# Patient Record
Sex: MALE | Race: WHITE | NOT HISPANIC OR LATINO | Employment: FULL TIME | URBAN - METROPOLITAN AREA
[De-identification: names, ages, dates, MRNs, and addresses within clinical notes are randomized per-mention and may not be internally consistent; named-entity substitution may affect disease eponyms.]

---

## 2017-08-21 DIAGNOSIS — N40.1 ENLARGED PROSTATE WITH LOWER URINARY TRACT SYMPTOMS (LUTS): ICD-10-CM

## 2017-09-07 ENCOUNTER — ALLSCRIPTS OFFICE VISIT (OUTPATIENT)
Dept: OTHER | Facility: OTHER | Age: 57
End: 2017-09-07

## 2017-09-07 LAB
BILIRUB UR QL STRIP: NORMAL
CLARITY UR: NORMAL
COLOR UR: YELLOW
GLUCOSE (HISTORICAL): NORMAL
HGB UR QL STRIP.AUTO: NORMAL
KETONES UR STRIP-MCNC: NORMAL MG/DL
LEUKOCYTE ESTERASE UR QL STRIP: NORMAL
NITRITE UR QL STRIP: NORMAL
PH UR STRIP.AUTO: 5.5 [PH]
PROT UR STRIP-MCNC: NORMAL MG/DL
SP GR UR STRIP.AUTO: 1.02
UROBILINOGEN UR QL STRIP.AUTO: 0.2

## 2018-01-12 VITALS
SYSTOLIC BLOOD PRESSURE: 128 MMHG | WEIGHT: 192 LBS | HEIGHT: 72 IN | DIASTOLIC BLOOD PRESSURE: 84 MMHG | BODY MASS INDEX: 26.01 KG/M2

## 2018-11-19 ENCOUNTER — HOSPITAL ENCOUNTER (OUTPATIENT)
Dept: RADIOLOGY | Facility: HOSPITAL | Age: 58
Discharge: HOME | End: 2018-11-19
Payer: COMMERCIAL

## 2018-11-19 ENCOUNTER — TRANSCRIBE ORDERS (OUTPATIENT)
Dept: ADMINISTRATIVE | Facility: HOSPITAL | Age: 58
End: 2018-11-19

## 2018-11-19 DIAGNOSIS — M54.12 BRACHIAL NEURITIS: ICD-10-CM

## 2018-11-19 DIAGNOSIS — M54.12 BRACHIAL NEURITIS: Primary | ICD-10-CM

## 2018-11-19 PROCEDURE — 72050 X-RAY EXAM NECK SPINE 4/5VWS: CPT

## 2020-10-11 ENCOUNTER — NURSE TRIAGE (OUTPATIENT)
Dept: OTHER | Facility: OTHER | Age: 60
End: 2020-10-11

## 2020-10-11 DIAGNOSIS — Z20.828 EXPOSURE TO SARS-ASSOCIATED CORONAVIRUS: ICD-10-CM

## 2020-10-11 DIAGNOSIS — Z20.828 EXPOSURE TO SARS-ASSOCIATED CORONAVIRUS: Primary | ICD-10-CM

## 2020-10-11 PROCEDURE — U0003 INFECTIOUS AGENT DETECTION BY NUCLEIC ACID (DNA OR RNA); SEVERE ACUTE RESPIRATORY SYNDROME CORONAVIRUS 2 (SARS-COV-2) (CORONAVIRUS DISEASE [COVID-19]), AMPLIFIED PROBE TECHNIQUE, MAKING USE OF HIGH THROUGHPUT TECHNOLOGIES AS DESCRIBED BY CMS-2020-01-R: HCPCS | Performed by: FAMILY MEDICINE

## 2020-10-13 LAB — SARS-COV-2 RNA SPEC QL NAA+PROBE: NOT DETECTED

## 2020-10-28 ENCOUNTER — TELEPHONE (OUTPATIENT)
Dept: SURGICAL ONCOLOGY | Facility: CLINIC | Age: 60
End: 2020-10-28

## 2020-11-19 ENCOUNTER — CONSULT (OUTPATIENT)
Dept: HEMATOLOGY ONCOLOGY | Facility: CLINIC | Age: 60
End: 2020-11-19
Payer: COMMERCIAL

## 2020-11-19 VITALS
OXYGEN SATURATION: 95 % | WEIGHT: 197.3 LBS | DIASTOLIC BLOOD PRESSURE: 86 MMHG | HEART RATE: 75 BPM | RESPIRATION RATE: 18 BRPM | BODY MASS INDEX: 26.72 KG/M2 | SYSTOLIC BLOOD PRESSURE: 128 MMHG | HEIGHT: 72 IN | TEMPERATURE: 97.4 F

## 2020-11-19 DIAGNOSIS — R71.8 ELEVATED HEMATOCRIT: ICD-10-CM

## 2020-11-19 DIAGNOSIS — D72.828 OTHER ELEVATED WHITE BLOOD CELL (WBC) COUNT: Primary | ICD-10-CM

## 2020-11-19 DIAGNOSIS — D75.839 THROMBOCYTOSIS: ICD-10-CM

## 2020-11-19 PROCEDURE — 99244 OFF/OP CNSLTJ NEW/EST MOD 40: CPT | Performed by: NURSE PRACTITIONER

## 2020-11-19 RX ORDER — ESCITALOPRAM OXALATE 20 MG/1
20 TABLET ORAL DAILY
COMMUNITY

## 2020-11-19 RX ORDER — GAUZE BANDAGE 2" X 2"
BANDAGE TOPICAL
COMMUNITY

## 2020-11-19 RX ORDER — NEOMYCIN/POLYMYXIN B/PRAMOXINE 3.5-10K-1
CREAM (GRAM) TOPICAL
COMMUNITY

## 2020-11-19 RX ORDER — SITAGLIPTIN AND METFORMIN HYDROCHLORIDE 1000; 50 MG/1; MG/1
TABLET, FILM COATED, EXTENDED RELEASE ORAL
COMMUNITY

## 2020-11-19 RX ORDER — EMPAGLIFLOZIN 25 MG/1
25 TABLET, FILM COATED ORAL DAILY
COMMUNITY

## 2020-11-19 RX ORDER — GLIPIZIDE 5 MG/1
5 TABLET ORAL DAILY
COMMUNITY

## 2020-11-24 ENCOUNTER — TELEPHONE (OUTPATIENT)
Dept: HEMATOLOGY ONCOLOGY | Facility: MEDICAL CENTER | Age: 60
End: 2020-11-24

## 2020-11-25 ENCOUNTER — HOSPITAL ENCOUNTER (OUTPATIENT)
Dept: ULTRASOUND IMAGING | Facility: HOSPITAL | Age: 60
Discharge: HOME | End: 2020-11-25
Payer: COMMERCIAL

## 2020-11-25 DIAGNOSIS — D75.839 THROMBOCYTOSIS: ICD-10-CM

## 2020-11-25 DIAGNOSIS — D72.828 OTHER ELEVATED WHITE BLOOD CELL (WBC) COUNT: ICD-10-CM

## 2020-11-25 DIAGNOSIS — R71.8 ELEVATED HEMATOCRIT: ICD-10-CM

## 2020-11-25 PROCEDURE — 76700 US EXAM ABDOM COMPLETE: CPT

## 2020-12-02 ENCOUNTER — TELEPHONE (OUTPATIENT)
Dept: HEMATOLOGY ONCOLOGY | Facility: CLINIC | Age: 60
End: 2020-12-02

## 2020-12-04 ENCOUNTER — TELEPHONE (OUTPATIENT)
Dept: HEMATOLOGY ONCOLOGY | Facility: CLINIC | Age: 60
End: 2020-12-04

## 2020-12-11 ENCOUNTER — TELEPHONE (OUTPATIENT)
Dept: HEMATOLOGY ONCOLOGY | Facility: MEDICAL CENTER | Age: 60
End: 2020-12-11

## 2020-12-15 ENCOUNTER — TELEPHONE (OUTPATIENT)
Dept: HEMATOLOGY ONCOLOGY | Facility: CLINIC | Age: 60
End: 2020-12-15

## 2020-12-21 ENCOUNTER — TELEPHONE (OUTPATIENT)
Dept: HEMATOLOGY ONCOLOGY | Facility: CLINIC | Age: 60
End: 2020-12-21

## 2021-01-11 ENCOUNTER — TELEPHONE (OUTPATIENT)
Dept: HEMATOLOGY ONCOLOGY | Facility: CLINIC | Age: 61
End: 2021-01-11

## 2021-01-11 NOTE — TELEPHONE ENCOUNTER
Patient[s wife, Adali Gastelum, is calling in requesting for someone to call to explain how a Virtual Visit is done   Adali Gastelum can be reached back at [phone]

## 2021-01-12 ENCOUNTER — TELEPHONE (OUTPATIENT)
Dept: HEMATOLOGY ONCOLOGY | Facility: CLINIC | Age: 61
End: 2021-01-12

## 2021-01-12 NOTE — TELEPHONE ENCOUNTER
Appointment Confirmation     Appointment with  Dr Lux    Appointment date & time 1- @ 3:20pm    Location Virtual Visit   Patient verbilized Understanding

## 2021-01-13 ENCOUNTER — TELEPHONE (OUTPATIENT)
Dept: HEMATOLOGY ONCOLOGY | Facility: CLINIC | Age: 61
End: 2021-01-13

## 2021-01-13 ENCOUNTER — TELEMEDICINE (OUTPATIENT)
Dept: HEMATOLOGY ONCOLOGY | Facility: CLINIC | Age: 61
End: 2021-01-13
Payer: COMMERCIAL

## 2021-01-13 DIAGNOSIS — D75.839 THROMBOCYTOSIS: ICD-10-CM

## 2021-01-13 DIAGNOSIS — D75.1 ERYTHROCYTOSIS: ICD-10-CM

## 2021-01-13 DIAGNOSIS — D72.828 OTHER ELEVATED WHITE BLOOD CELL (WBC) COUNT: Primary | ICD-10-CM

## 2021-01-13 PROCEDURE — 99214 OFFICE O/P EST MOD 30 MIN: CPT | Performed by: INTERNAL MEDICINE

## 2021-01-13 NOTE — TELEPHONE ENCOUNTER
Patient had virtual apt today Wed 01/13/2021 w/Dr  Liliana Maxon  Called pt and scheduled 4 mo f/u apt for Thursday 5/13/2021 at 2:30 pm w/Inga CALIXTO at the [facility] office  Patient will have labs completed one week prior  Mailing out lab slips and AVS to pt  Confirmed address

## 2021-01-13 NOTE — PROGRESS NOTES
Virtual Brief Visit  It was my intent to perform this visit via video technology but the patient was not able to do a video connection so the visit was completed via audio telephone only  Assessment/Plan:  1  Other elevated white blood cell (WBC) count  The patient and his wife were both educated about the extensive workup which was done in December of 2020 for his abnormal CBC  The patient was told that there is no hint of lymphoproliferative or myeloproliferative disorder according to multiple studies including negative JAK2 mutation and negative BCR-ABL study and negative flow cytometry from the peripheral blood  The patient was told that his elevated white cells, red cells and platelets are most likely related to reactive process like smoking  He was educated briefly about smoking cessation   - CBC and differential; Future  - Comprehensive metabolic panel; Future  - LD,Blood; Future  - Ferritin; Future  - Iron Panel (Includes Ferritin, Iron Sat%, Iron, and TIBC); Future  - C-reactive protein; Future  - Sedimentation rate, automated; Future    2  Thrombocytosis (HCC)  His mildly elevated platelet count in the high normal range is most likely reactive  He does seem to have borderline low ferritin level  This level will be repeated before next visit  Further workup will be done accordingly     - CBC and differential; Future  - Comprehensive metabolic panel; Future  - LD,Blood; Future  - Ferritin; Future  - Iron Panel (Includes Ferritin, Iron Sat%, Iron, and TIBC); Future  - C-reactive protein; Future  - Sedimentation rate, automated; Future    3  Erythrocytosis  The elevated hemoglobin hematocrit is not related to polycythemia vera with negative JAK2 mutation studies  It is most likely related to the smoking   - CBC and differential; Future  - Comprehensive metabolic panel; Future  - LD,Blood; Future  - Ferritin; Future  - Iron Panel (Includes Ferritin, Iron Sat%, Iron, and TIBC);  Future  - C-reactive protein; Future  - Sedimentation rate, automated; Future    Problem List Items Addressed This Visit     None                Reason for visit is   Chief Complaint   Patient presents with    Virtual Brief Visit        Encounter provider Cliff Griffith MD    Provider located at [address] [place] [address]  [phone]    Recent Visits  No visits were found meeting these conditions  Showing recent visits within past 7 days and meeting all other requirements     Today's Visits  Date Type Provider Dept   01/13/21 Ham Ocampo MD [facility]   Showing today's visits and meeting all other requirements     Future Appointments  No visits were found meeting these conditions  Showing future appointments within next 150 days and meeting all other requirements        After connecting through telephone, the patient was identified by name and date of birth  Grady Nichole was informed that this is a telemedicine visit and that the visit is being conducted through telephone  My office door was closed  No one else was in the room  He acknowledged consent and understanding of privacy and security of the platform  The patient has agreed to participate and understands he can discontinue the visit at any time  Patient is aware this is a billable service  Subjective    Grady Nichole is a 62 y o  male   HPI   Patient is a 55-year-old male who presents accompanied by his wife for further evaluation of his leukocytosis  He has past medical history of hyperlipidemia, diabetes, degenerative changes to the cervical lumbar spine and tobacco abuse  He is a 1 pack per day smoker times 40+ years  Also admits to alcohol use typically 2 weekends out of the month  He is up-to-date with his colonoscopies last 1 was 1 year ago    No history of splenectomy      The patient states that he is under lot of stress due to his job situation  Reports that several years ago he lost significant amount of weight about 20 lb of unknown etiology but weight has been stable over the past few years around 195-200 lb  He has chronic low back pain reports that he also has neck pain has been exacerbated more recently and he is experiencing some numbness and tingling in his left hand that he attributes to that  Patient mentions that he has been significantly fatigued more than usual recently  Denies rash, frequent infections/fevers or lymphadenopathy  Does admit to night sweats typically 2 times per week which has been going on for the past few years      His most recent CBC from 10/14/2020 [facility] showed mild leukocytosis WBC 11 1 with normal differential and no hint of immature cells on the peripheral blood  The patient is not anemic hemoglobin 16 7 hematocrit slightly above average 48 7, MCV 97  He also seems to have mild thrombocytosis platelet count 289  CMP from September 2020 showed elevated glucose 173 with normal LFTs and renal function  The patient continues to complain about significant fatigue  He continues to smoke daily  He had extensive workup for his abnormal CBC on 12/03/2020 which was negative for the JAK2 mutation or BCR-ABL  Flow cytometry from the peripheral blood was negative for any hint of clonal B or T-cell population or increased CD 34 blasts  Monoclonal gammopathy workup was negative  Hepatitis panel was nonreactive  HIV test came back negative  Creatinine was 1 0 with normal potassium, normal calcium and normal liver enzymes  Ferritin was 48 with normal C-reactive protein and rheumatoid factor levels  Hemoglobin was 16 2 with hematocrit of 47%  The white cell count was 10 2 with normal white cell differential   Platelet count was 730  Erythropoietin was 8    Ultrasound of the abdomen showed hepatic steatosis, right nephrolithiasis and small left renal cyst   He denied bleeding from any sites  Past Medical History:   Diagnosis Date    Back pain     Benign localized prostatic hyperplasia with lower urinary tract symptoms (LUTS)     Feeling of incomplete bladder emptying     Hypercholesteremia     Nocturia     Type 2 diabetes mellitus (HCC)     Urinary frequency     Weak urinary stream        Past Surgical History:   Procedure Laterality Date    BACK SURGERY  1983    SINUS SURGERY  1982    For removal of tumor    VASECTOMY Bilateral 1987       Current Outpatient Medications   Medication Sig Dispense Refill    aspirin 81 MG tablet Take by mouth      B Complex-C (Super B Complex/Vitamin C) TABS       Empagliflozin (Jardiance) 25 MG TABS Take 25 mg by mouth daily       escitalopram (LEXAPRO) 20 mg tablet Take 20 mg by mouth daily       ezetimibe (ZETIA) 10 mg tablet Take by mouth      Flaxseed, Linseed, (Flaxseed Oil Max Str) 1300 MG CAPS       glipiZIDE (GLUCOTROL) 5 mg tablet Take 5 mg by mouth daily       ibuprofen (MOTRIN) 600 mg tablet Take by mouth      metFORMIN (GLUMETZA) 1000 MG (MOD) 24 hr tablet Take by mouth      Omega-3 Fatty Acids (OMEGA-3 FISH OIL) 300 MG CAPS Take by mouth      rosuvastatin (CRESTOR) 20 MG tablet Take by mouth      SITagliptin-metFORMIN HCl ER (Janumet XR)  MG TB24        No current facility-administered medications for this visit  No Known Allergies    Review of Systems   Constitutional: Positive for activity change and fatigue  Negative for chills and fever  HENT: Positive for rhinorrhea  Negative for ear pain and sore throat  Eyes: Negative for pain and visual disturbance  Respiratory: Positive for cough and shortness of breath  Cardiovascular: Negative for chest pain and palpitations  Gastrointestinal: Negative for abdominal pain and vomiting  Genitourinary: Positive for frequency  Negative for dysuria and hematuria  Musculoskeletal: Negative for arthralgias and back pain     Skin: Negative for color change and rash  Neurological: Negative for seizures and syncope  All other systems reviewed and are negative  There were no vitals filed for this visit  I spent 20 minutes directly with the patient during this visit    VIRTUAL VISIT DISCLAIMER    Lavon Wilkinson acknowledges that he has consented to an online visit or consultation  He understands that the online visit is based solely on information provided by him, and that, in the absence of a face-to-face physical evaluation by the physician, the diagnosis he receives is both limited and provisional in terms of accuracy and completeness  This is not intended to replace a full medical face-to-face evaluation by the physician  Lavon Wilkinson understands and accepts these terms

## 2024-05-21 ENCOUNTER — HOSPITAL ENCOUNTER (OUTPATIENT)
Dept: CT IMAGING | Facility: HOSPITAL | Age: 64
Discharge: HOME | End: 2024-05-21
Payer: COMMERCIAL

## 2024-05-21 ENCOUNTER — TELEPHONE (OUTPATIENT)
Dept: HEMATOLOGY ONCOLOGY | Facility: CLINIC | Age: 64
End: 2024-05-21

## 2024-05-21 DIAGNOSIS — Z72.0 TOBACCO USE: ICD-10-CM

## 2024-05-21 PROCEDURE — 71271 CT THORAX LUNG CANCER SCR C-: CPT

## 2024-05-21 NOTE — TELEPHONE ENCOUNTER
Call Transfer   Who are you speaking with?  Spouse   If it is not the patient, are they listed on an active communication consent form? Yes   Who is the patients HemOnc/SurgOnc provider? Dr. Lux   What is the reason for this call? Medical records   Person/Department that the call was transferred to?    Time that call was transferred?    savage @ medical records 10:29   Your call will be transferred now. If you receive a voicemail, please leave a detailed message and a member of the team will return your call as soon as possible.    Did you relay this information to the caller?  N/A

## 2024-06-24 ENCOUNTER — OFFICE VISIT (OUTPATIENT)
Dept: UROLOGY | Facility: CLINIC | Age: 64
End: 2024-06-24
Payer: COMMERCIAL

## 2024-06-24 VITALS
RESPIRATION RATE: 16 BRPM | BODY MASS INDEX: 27.93 KG/M2 | TEMPERATURE: 98 F | HEIGHT: 72 IN | OXYGEN SATURATION: 95 % | WEIGHT: 206.2 LBS | HEART RATE: 77 BPM | SYSTOLIC BLOOD PRESSURE: 136 MMHG | DIASTOLIC BLOOD PRESSURE: 80 MMHG

## 2024-06-24 DIAGNOSIS — Z12.5 SCREENING FOR PROSTATE CANCER: Primary | ICD-10-CM

## 2024-06-24 PROCEDURE — 99203 OFFICE O/P NEW LOW 30 MIN: CPT

## 2024-06-24 NOTE — PROGRESS NOTES
6/24/2024    Chief Complaint   Patient presents with    Annual PSA       Assessment and Plan    64 y.o. male new patient to office    1.  Prostate cancer screening  PSA trend  3.05 (4/29/2024)  2.50 (4/19/2023)  1.94 (4/14/2022)  2.81 (3/6/2021)  Rectal exam benign today.  There is firmness noted bilaterally however no appreciated nodule, otherwise smooth and symmetric.  He can follow-up in 1 year with PSA prior.      History of Present Illness  Talha Lutz IV is a 64 y.o. male new patient to office. Here to establish care for routine prostate cancer screening.    Denies any family history of urinary tract malignancy. He reports no bothersome baseline lower urinary tract symptoms. He feels he has a good urinary stream, he does get up about 2 times per night but not bothersome to him. No urinary frequency or hesitancy. No history of BPH, UTI, prostatitis, or nephrolithiasis. No gross hematuria.     Current smoker of 30 years, 1 ppd average.     Prostate cancer screening: Current PSA 3.05 as of 4/29/2024, previously 2.50 on 4/19/2023, 1.94 on 4/14/2022, and 2.81 on 3/6/2021.      Review of Systems   Constitutional:  Negative for chills and fever.   HENT:  Negative for congestion and sore throat.    Respiratory:  Negative for cough and shortness of breath.    Cardiovascular:  Negative for chest pain and leg swelling.   Gastrointestinal:  Negative for abdominal pain, constipation and diarrhea.   Genitourinary:  Negative for difficulty urinating, dysuria, frequency, hematuria and urgency.        Nocturia   Musculoskeletal:  Negative for back pain and gait problem.   Skin:  Negative for wound.   Allergic/Immunologic: Negative for immunocompromised state.   Hematological:  Does not bruise/bleed easily.           AUA SYMPTOM SCORE      Flowsheet Row Most Recent Value   AUA SYMPTOM SCORE    How often have you had a sensation of not emptying your bladder completely after you finished urinating? 0 (P)     How often have you  had to urinate again less than two hours after you finished urinating? 3 (P)     How often have you found you stopped and started again several times when you urinate? 2 (P)     How often have you found it difficult to postpone urination? 4 (P)     How often have you had a weak urinary stream? 4 (P)     How often have you had to push or strain to begin urination? 0 (P)     How many times did you most typically get up to urinate from the time you went to bed at night until the time you got up in the morning? 5 (P)     Quality of Life: If you were to spend the rest of your life with your urinary condition just the way it is now, how would you feel about that? 3 (P)     AUA SYMPTOM SCORE 18 (P)              Vitals  Vitals:    06/24/24 0830   BP: 136/80   BP Location: Right arm   Patient Position: Sitting   Cuff Size: Adult   Pulse: 77   Resp: 16   Temp: 98 °F (36.7 °C)   TempSrc: Temporal   SpO2: 95%   Weight: 93.5 kg (206 lb 3.2 oz)   Height: 6' (1.829 m)       Physical Exam  Vitals reviewed.   Constitutional:       General: He is not in acute distress.     Appearance: Normal appearance. He is not ill-appearing or toxic-appearing.   HENT:      Head: Normocephalic and atraumatic.   Eyes:      General: No scleral icterus.     Conjunctiva/sclera: Conjunctivae normal.   Cardiovascular:      Rate and Rhythm: Normal rate.   Pulmonary:      Effort: Pulmonary effort is normal. No respiratory distress.   Abdominal:      Tenderness: There is no right CVA tenderness or left CVA tenderness.      Hernia: No hernia is present.   Musculoskeletal:      Cervical back: Normal range of motion.      Right lower leg: No edema.      Left lower leg: No edema.   Skin:     General: Skin is warm and dry.      Coloration: Skin is not jaundiced or pale.   Neurological:      General: No focal deficit present.      Mental Status: He is alert and oriented to person, place, and time. Mental status is at baseline.      Gait: Gait normal.    Psychiatric:         Mood and Affect: Mood normal.         Behavior: Behavior normal.         Thought Content: Thought content normal.         Judgment: Judgment normal.         Past History  Past Medical History:   Diagnosis Date    Back pain     Benign localized prostatic hyperplasia with lower urinary tract symptoms (LUTS)     Diabetes mellitus (HCC) 2005    Feeling of incomplete bladder emptying     Hypercholesteremia     Nocturia     Personal history of colonic polyps 2016    tubular adenoma    Type 2 diabetes mellitus (HCC)     Urinary frequency     Weak urinary stream      Social History     Socioeconomic History    Marital status: /Civil Union     Spouse name: None    Number of children: None    Years of education: None    Highest education level: None   Occupational History    Occupation: Retired - Maintenance   Tobacco Use    Smoking status: Every Day     Current packs/day: 1.00     Average packs/day: 1 pack/day for 43.5 years (43.5 ttl pk-yrs)     Types: Cigarettes     Start date: 1/1/1981    Smokeless tobacco: Never   Substance and Sexual Activity    Alcohol use: Yes     Alcohol/week: 5.0 standard drinks of alcohol     Types: 5 Cans of beer per week     Comment: sparingly    Drug use: No    Sexual activity: Yes     Partners: Female     Birth control/protection: Male Sterilization   Other Topics Concern    None   Social History Narrative    None     Social Determinants of Health     Financial Resource Strain: Not on file   Food Insecurity: Not on file   Transportation Needs: Not on file   Physical Activity: Not on file   Stress: Not on file   Social Connections: Not on file   Intimate Partner Violence: Not on file   Housing Stability: Not on file     Social History     Tobacco Use   Smoking Status Every Day    Current packs/day: 1.00    Average packs/day: 1 pack/day for 43.5 years (43.5 ttl pk-yrs)    Types: Cigarettes    Start date: 1/1/1981   Smokeless Tobacco Never     Family History   Problem  "Relation Age of Onset    Colon cancer Mother     Cancer Mother     Ovarian cancer Sister        The following portions of the patient's history were reviewed and updated as appropriate allergies, current medications, past medical history, past social history, past surgical history and problem list    Imaging:    Results  No results found for this or any previous visit (from the past 1 hour(s)).]  No results found for: \"PSA\"  Lab Results   Component Value Date    CALCIUM 8.7 04/29/2024    K 4.5 04/29/2024    CO2 24 04/29/2024     04/29/2024    BUN 13 04/29/2024    CREATININE 1.09 04/29/2024     No results found for: \"WBC\", \"HGB\", \"HCT\", \"MCV\", \"PLT\"    Please Note:  Voice dictation software has been used to create this document. There may be inadvertent transcriptions errors.     MARILY Gooden 06/24/24   "

## 2025-03-24 ENCOUNTER — PATIENT MESSAGE (OUTPATIENT)
Age: 65
End: 2025-03-24

## 2025-03-24 ENCOUNTER — TELEPHONE (OUTPATIENT)
Age: 65
End: 2025-03-24

## 2025-03-24 NOTE — TELEPHONE ENCOUNTER
Patient was calling because he found a lump in his testicle. Says there was some discomfort and slight pain. He believes it might be a growth.     Patient was scheduled for a Urg Follow up appt. Tried connecting over to Triage to discuss symptoms but could not.     Patient would appreciate a message back via his MyChart if to discuss symptoms or if any testing is needed prior to his appt.

## 2025-03-26 ENCOUNTER — HOSPITAL ENCOUNTER (OUTPATIENT)
Dept: ULTRASOUND IMAGING | Facility: HOSPITAL | Age: 65
Discharge: HOME | End: 2025-03-26
Payer: COMMERCIAL

## 2025-03-26 DIAGNOSIS — N50.89 EDEMA OF MALE GENITAL ORGANS: ICD-10-CM

## 2025-03-26 PROCEDURE — 76870 US EXAM SCROTUM: CPT

## 2025-04-01 NOTE — TELEPHONE ENCOUNTER
Patient's wife called in asking about the course for the patient and how to proceed. I rescheduled a sooner follow up appointment in the office to discuss the patient's hydrocele.

## 2025-04-01 NOTE — PROGRESS NOTES
Name: Talha Lutz IV      : 1960      MRN: [id number]  Encounter Provider: MARILY Mariano  Encounter Date: 2025   Encounter department: [facility] [place]  :  Assessment & Plan  Encysted hydrocele  3/26/25 Scrotal US - 8.7 x 6.3 x 4.6 cm cyst in the right hemiscrotum, separate from the right testis and right epididymis. Consistent with a loculated or encysted hydrocele  Patient reports increasing pain and constant discomfort   He has been taking ibuprofen without much relief   He is interested in proceeding with surgical intervention for a right hydrocelectomy   Procedure and post-op expectations discussed with patient and spouse   Case request placed and consent signed in office   Recommend scrotal supportive underwear, tylenol/motrin for pain, ice/heat on/off in the meantime   Orders:    Case request operating room: HYDROCELECTOMY; Standing    Urine culture      History of Present Illness   Talha Lutz IV is a 65 y.o. male who presents for evaluation of a hydrocele. He developed some discomfort and a lump in his right testicle. He was seen by his PCP who ordered a scrotal US which was performed on 3/26/25 and showed an 8.7cm cyst in the right hemiscrotum, most likely a loculated or encysted hydrocele. He complains of constant soreness and discomfort with certain positions and sleeping. He reports taking ibuprofen daily without much relief. He is interested in surgical intervention.      Review of Systems   Constitutional:  Negative for chills, diaphoresis, fatigue and fever.   Respiratory:  Negative for cough and shortness of breath.    Gastrointestinal:  Negative for abdominal pain, diarrhea, nausea and vomiting.   Genitourinary:  Positive for scrotal swelling and testicular pain. Negative for decreased urine volume, difficulty urinating, dysuria, flank pain, frequency, hematuria and urgency.   Musculoskeletal:  Negative for back pain and myalgias.   Skin:  Negative for  pallor and wound.   Neurological:  Negative for dizziness, weakness, light-headedness and numbness.     Pertinent Medical History       Medical History Reviewed by provider this encounter:     .  Past Medical History   Past Medical History:   Diagnosis Date    Back pain     Benign localized prostatic hyperplasia with lower urinary tract symptoms (LUTS)     Diabetes mellitus (HCC) 2005    Feeling of incomplete bladder emptying     Hypercholesteremia     Nocturia     Personal history of colonic polyps 2016    tubular adenoma    Type 2 diabetes mellitus (HCC)     Urinary frequency     Weak urinary stream      Past Surgical History:   Procedure Laterality Date    BACK SURGERY  1983    COLONOSCOPY  12/2019    7mm hyperplastic sigmoid polyp, 2mm small benign lymphoid distal transverse polyp Dr Diego    SINUS SURGERY  1982    For removal of tumor    VASECTOMY Bilateral 1987     Family History   Problem Relation Age of Onset    Colon cancer Mother     Cancer Mother     Ovarian cancer Sister       reports that he has been smoking cigarettes. He started smoking about 44 years ago. He has a 44.2 pack-year smoking history. He has never used smokeless tobacco. He reports current alcohol use of about 5.0 standard drinks of alcohol per week. He reports that he does not use drugs.  Current Outpatient Medications   Medication Instructions    aspirin 81 MG tablet Take by mouth    B Complex-C (Super B Complex/Vitamin C) TABS No dose, route, or frequency recorded.    glipiZIDE (GLUCOTROL) 10 mg, Daily    ibuprofen (MOTRIN) 600 mg tablet Take by mouth    Jardiance 25 mg, Daily    metFORMIN (GLUCOPHAGE-XR) 500 mg, 4 times daily    Omeprazole 20 mg    pioglitazone (ACTOS) 30 mg, Daily    rosuvastatin (CRESTOR) 40 mg    tirzepatide (Zepbound) 2.5 mg/0.5 mL auto-injector Weekly     Allergies   Allergen Reactions    Semaglutide(0.25 Or 0.5mg-Dos) GI Intolerance and Other (See Comments)      Current Outpatient Medications on File Prior to  Visit   Medication Sig Dispense Refill    aspirin 81 MG tablet Take by mouth      B Complex-C (Super B Complex/Vitamin C) TABS       Empagliflozin (Jardiance) 25 MG TABS Take 25 mg by mouth daily       glipiZIDE (GLUCOTROL) 10 mg tablet Take 10 mg by mouth daily      ibuprofen (MOTRIN) 600 mg tablet Take by mouth      metFORMIN (GLUCOPHAGE-XR) 500 mg 24 hr tablet Take 500 mg by mouth 4 times a day      Omeprazole 20 MG TBDD Take 20 mg by mouth      pioglitazone (ACTOS) 30 mg tablet Take 30 mg by mouth daily      rosuvastatin (CRESTOR) 40 MG tablet 40 mg      tirzepatide (Zepbound) 2.5 mg/0.5 mL auto-injector Inject under the skin Once a week      [DISCONTINUED] metFORMIN (GLUMETZA) 1000 MG (MOD) 24 hr tablet Take by mouth      [DISCONTINUED] rosuvastatin (CRESTOR) 20 MG tablet Take by mouth      [DISCONTINUED] SITagliptin-metFORMIN HCl ER (Janumet XR)  MG TB24 2 tablets in the morning       No current facility-administered medications on file prior to visit.      Social History     Tobacco Use    Smoking status: Every Day     Current packs/day: 1.00     Average packs/day: 1 pack/day for 44.2 years (44.2 ttl pk-yrs)     Types: Cigarettes     Start date: 1/1/1981    Smokeless tobacco: Never   Vaping Use    Vaping status: Never Used   Substance and Sexual Activity    Alcohol use: Yes     Alcohol/week: 5.0 standard drinks of alcohol     Types: 5 Cans of beer per week     Comment: sparingly    Drug use: No    Sexual activity: Yes     Partners: Female     Birth control/protection: Male Sterilization        Objective   /76 (BP Location: Left arm, Patient Position: Sitting, Cuff Size: Large)   Pulse 70   Ht 6' (1.829 m)   Wt 89.8 kg (198 lb)   SpO2 98%   BMI 26.85 kg/m²     Physical Exam  Constitutional:       Appearance: Normal appearance.   HENT:      Head: Normocephalic and atraumatic.   Eyes:      Conjunctiva/sclera: Conjunctivae normal.   Cardiovascular:      Rate and Rhythm: Normal rate and regular  rhythm.   Pulmonary:      Effort: Pulmonary effort is normal. No respiratory distress.   Abdominal:      General: There is no distension.   Genitourinary:     Comments: Right scrotal swelling and tenderness to palpation, no warmth or erythema  Musculoskeletal:         General: Normal range of motion.      Cervical back: Normal range of motion.   Skin:     General: Skin is warm and dry.   Neurological:      General: No focal deficit present.      Mental Status: He is alert and oriented to person, place, and time.   Psychiatric:         Mood and Affect: Mood normal.         Behavior: Behavior normal.        Results   Lab Results   Component Value Date    CALCIUM 8.7 04/29/2024    K 4.5 04/29/2024    CO2 24 04/29/2024     04/29/2024    BUN 13 04/29/2024    CREATININE 1.09 04/29/2024

## 2025-04-02 ENCOUNTER — OFFICE VISIT (OUTPATIENT)
Dept: UROLOGY | Facility: MEDICAL CENTER | Age: 65
End: 2025-04-02
Payer: COMMERCIAL

## 2025-04-02 ENCOUNTER — PREP FOR PROCEDURE (OUTPATIENT)
Dept: UROLOGY | Facility: MEDICAL CENTER | Age: 65
End: 2025-04-02

## 2025-04-02 VITALS
SYSTOLIC BLOOD PRESSURE: 138 MMHG | WEIGHT: 198 LBS | OXYGEN SATURATION: 98 % | HEIGHT: 72 IN | DIASTOLIC BLOOD PRESSURE: 76 MMHG | BODY MASS INDEX: 26.82 KG/M2 | HEART RATE: 70 BPM

## 2025-04-02 DIAGNOSIS — N43.0 ENCYSTED HYDROCELE: Primary | ICD-10-CM

## 2025-04-02 DIAGNOSIS — E11.9 TYPE 2 DIABETES MELLITUS WITHOUT COMPLICATION, UNSPECIFIED WHETHER LONG TERM INSULIN USE (HCC): ICD-10-CM

## 2025-04-02 DIAGNOSIS — Z01.810 PRE-OPERATIVE CARDIOVASCULAR EXAMINATION: ICD-10-CM

## 2025-04-02 DIAGNOSIS — Z01.812 PRE-OPERATIVE LABORATORY EXAMINATION: ICD-10-CM

## 2025-04-02 PROCEDURE — 99213 OFFICE O/P EST LOW 20 MIN: CPT

## 2025-04-02 PROCEDURE — 87086 URINE CULTURE/COLONY COUNT: CPT

## 2025-04-02 RX ORDER — TIRZEPATIDE 2.5 MG/.5ML
INJECTION, SOLUTION SUBCUTANEOUS WEEKLY
COMMUNITY
Start: 2025-02-27 | End: 2025-04-21

## 2025-04-02 RX ORDER — ROSUVASTATIN CALCIUM 40 MG/1
40 TABLET, COATED ORAL
COMMUNITY
Start: 2025-01-16

## 2025-04-02 RX ORDER — METFORMIN HYDROCHLORIDE 500 MG/1
500 TABLET, EXTENDED RELEASE ORAL 4 TIMES DAILY
COMMUNITY
Start: 2025-03-25

## 2025-04-03 LAB — BACTERIA UR CULT: NORMAL

## 2025-04-04 NOTE — TELEPHONE ENCOUNTER
Spoke with patient and confirmed surgery date of 4/29/25  Type of surgery: Hydrocelectomy  Operating physician:Dr. Green  Location of surgery: [place] OR    Verbally went over prep with patient on 4/4/25  NPO  Bowel prep? No  Hospital calls afternoon prior with arrival time (calls Friday afternoon for Monday surgery)  Patient needs ride to and from surgery   outpatient  Pre-op testing to be done 2 weeks prior to surgery. All testing can be done as a walk-in. EKG can only be done as a walk-in at any [facility].  CBC, BMP, A1C, EKG  Blood thinners:   Aspirin  Clearances needed: Medical    Emailed to patient on 4/7/25  Copy of packet scanned into Media  Labs in packet and in electronic record   Soap prep in packet  post-op in packet     Consent: in media     Medical Clearance: YES  Appt with[facility]  Appt date and time:4/7/25  Date clearance form faxed:4/7/25  Best fax number:[phone]    Medication Suspension of: Aspirin  Ordering provider: [facility]  Faxed Medication Suspension form on: 4/7/25  Best fax number:[phone]

## 2025-04-15 ENCOUNTER — PREP FOR PROCEDURE (OUTPATIENT)
Dept: UROLOGY | Facility: MEDICAL CENTER | Age: 65
End: 2025-04-15

## 2025-04-15 ENCOUNTER — PREP FOR PROCEDURE (OUTPATIENT)
Dept: UROLOGY | Facility: CLINIC | Age: 65
End: 2025-04-15

## 2025-04-17 LAB
ANION GAP SERPL CALCULATED.3IONS-SCNC: 10 MMOL/L (ref 3–11)
BASOPHILS # BLD AUTO: 0.1 THOU/CMM (ref 0–0.1)
BASOPHILS NFR BLD AUTO: 1 %
BUN SERPL-MCNC: 25 MG/DL (ref 7–28)
CALCIUM SERPL-MCNC: 9.3 MG/DL (ref 8.5–10.5)
CHLORIDE SERPL-SCNC: 106 MMOL/L (ref 100–109)
CO2 SERPL-SCNC: 25 MMOL/L (ref 21–31)
CREAT SERPL-MCNC: 1.08 MG/DL (ref 0.53–1.3)
CYTOLOGY CMNT CVX/VAG CYTO-IMP: ABNORMAL
DIFFERENTIAL METHOD BLD: ABNORMAL
EOSINOPHIL # BLD AUTO: 0.4 THOU/CMM (ref 0–0.5)
EOSINOPHIL NFR BLD AUTO: 3 %
ERYTHROCYTE [DISTWIDTH] IN BLOOD BY AUTOMATED COUNT: 13.3 % (ref 12–16)
EST. AVERAGE GLUCOSE BLD GHB EST-MCNC: 166 MG/DL
GFR/BSA.PRED SERPLBLD CYS-BASED-ARV: 77 ML/MIN/{1.73_M2}
GLUCOSE SERPL-MCNC: 158 MG/DL (ref 65–99)
HBA1C MFR BLD: 7.4 %
HCT VFR BLD AUTO: 45.8 % (ref 37–48)
HGB BLD-MCNC: 15.3 G/DL (ref 12.5–17)
LYMPHOCYTES # BLD AUTO: 3.4 THOU/CMM (ref 1–3)
LYMPHOCYTES NFR BLD AUTO: 27 %
MCH RBC QN AUTO: 31.6 PG (ref 27–36)
MCHC RBC AUTO-ENTMCNC: 33.5 G/DL (ref 32–37)
MCV RBC AUTO: 94 FL (ref 80–100)
MONOCYTES # BLD AUTO: 1.1 THOU/CMM (ref 0.3–1)
MONOCYTES NFR BLD AUTO: 9 %
NEUTROPHILS # BLD AUTO: 7.6 THOU/CMM (ref 1.8–7.8)
NEUTROPHILS NFR BLD AUTO: 60 %
PLATELET # BLD AUTO: 383 THOU/CMM (ref 140–350)
PMV BLD REES-ECKER: 7.1 FL (ref 7.5–11.3)
POTASSIUM SERPL-SCNC: 4.3 MMOL/L (ref 3.5–5.2)
RBC # BLD AUTO: 4.86 MILL/CMM (ref 4–5.4)
SODIUM SERPL-SCNC: 141 MMOL/L (ref 135–145)
WBC # BLD AUTO: 12.6 THOU/CMM (ref 4–10.5)

## 2025-04-18 ENCOUNTER — OFFICE VISIT (OUTPATIENT)
Dept: LAB | Facility: HOSPITAL | Age: 65
End: 2025-04-18
Attending: UROLOGY
Payer: COMMERCIAL

## 2025-04-18 DIAGNOSIS — N43.0 ENCYSTED HYDROCELE: ICD-10-CM

## 2025-04-18 DIAGNOSIS — Z01.810 PRE-OPERATIVE CARDIOVASCULAR EXAMINATION: ICD-10-CM

## 2025-04-18 LAB
ATRIAL RATE: 75 BPM
P AXIS: 48 DEGREES
PR INTERVAL: 138 MS
QRS AXIS: 67 DEGREES
QRSD INTERVAL: 82 MS
QT INTERVAL: 402 MS
QTC INTERVAL: 449 MS
T WAVE AXIS: 41 DEGREES
VENTRICULAR RATE: 75 BPM

## 2025-04-18 PROCEDURE — 93010 ELECTROCARDIOGRAM REPORT: CPT | Performed by: INTERNAL MEDICINE

## 2025-04-18 PROCEDURE — 93005 ELECTROCARDIOGRAM TRACING: CPT

## 2025-04-22 ENCOUNTER — PATIENT MESSAGE (OUTPATIENT)
Dept: UROLOGY | Facility: MEDICAL CENTER | Age: 65
End: 2025-04-22

## 2025-04-22 NOTE — PRE-PROCEDURE INSTRUCTIONS
Pre-Surgery Instructions:   Medication Instructions    aspirin 81 MG tablet Stop taking 7 days prior to surgery.    B Complex-C (Super B Complex/Vitamin C) TABS Stop taking 7 days prior to surgery.    Empagliflozin (Jardiance) 25 MG TABS Stop taking 4 days prior to surgery. Last dose 4/24/25    glipiZIDE (GLUCOTROL) 10 mg tablet Take night before surgery    ibuprofen (MOTRIN) 600 mg tablet Stop taking 7 days prior to surgery.    metFORMIN (GLUCOPHAGE-XR) 500 mg 24 hr tablet Take night before surgery    Omeprazole 20 MG TBDD Rx will be done by day of surgery    pioglitazone (ACTOS) 30 mg tablet Take night before surgery    rosuvastatin (CRESTOR) 40 MG tablet Take night before surgery    tirzepatide (Zepbound) 2.5 mg/0.5 mL auto-injector Stop taking 7 days prior to surgery. Last dose 4/20/25        Medication instructions for day of surgery reviewed. Please take all instructed medications with only a sip of water.       You will receive a call one business day prior to surgery with an arrival time and hospital directions. If your surgery is scheduled on a Monday, the hospital will be calling you on the Friday prior to your surgery. If you have not heard from anyone by 8pm, please call the [facility] supervisor through the hospital  at [phone].     Do not eat or drink anything after midnight the night before your surgery, including candy, mints, lifesavers, or chewing gum. Do not drink alcohol 24hrs before your surgery. Try not to smoke at least 24hrs before your surgery.       Follow the pre surgery showering instructions as listed in the “My Surgical Experience Booklet” or otherwise provided by your surgeon's office. Do not use a blade to shave the surgical area 1 week before surgery. It is okay to use a clean electric clippers up to 24 hours before surgery. Do not apply any lotions, creams, including makeup, cologne, deodorant, or perfumes after showering on the day of your surgery. Do not use  dry shampoo, hair spray, hair gel, or any type of hair products.     No contact lenses, eye make-up, or artificial eyelashes. Remove nail polish, including gel polish, and any artificial, gel, or acrylic nails if possible. Remove all jewelry including rings and body piercing jewelry.     Wear causal clothing that is easy to take on and off. Consider your type of surgery.    Keep any valuables, jewelry, piercings at home. Please bring any specially ordered equipment (sling, braces) if indicated.    Arrange for a responsible person to drive you to and from the hospital on the day of your surgery. Please confirm the visitor policy for the day of your procedure when you receive your phone call with an arrival time.     Call the surgeon's office with any new illnesses, exposures, or additional questions prior to surgery.    Please reference your “My Surgical Experience Booklet” for additional information to prepare for your upcoming surgery.

## 2025-04-23 NOTE — PATIENT COMMUNICATION
Patient wife calling asking if Dr. Green can send the medications that would be prescribed post op to the pharmacy today so that she can have them at home before hand.    Please advise and CB #[phone]

## 2025-04-25 DIAGNOSIS — N43.0 ENCYSTED HYDROCELE: Primary | ICD-10-CM

## 2025-04-25 PROCEDURE — NC001 PR NO CHARGE: Performed by: UROLOGY

## 2025-04-25 RX ORDER — HYDROCODONE BITARTRATE AND ACETAMINOPHEN 5; 325 MG/1; MG/1
1 TABLET ORAL EVERY 4 HOURS PRN
Qty: 20 TABLET | Refills: 0 | Status: SHIPPED | OUTPATIENT
Start: 2025-04-25 | End: 2025-05-03

## 2025-04-25 RX ORDER — CEFUROXIME AXETIL 250 MG/1
250 TABLET ORAL EVERY 12 HOURS SCHEDULED
Qty: 10 TABLET | Refills: 0 | Status: SHIPPED | OUTPATIENT
Start: 2025-04-25 | End: 2025-04-30

## 2025-04-25 NOTE — H&P
HISTORY AND PHYSICAL  ?  ?  Patient Name: Talha Lutz IV  Patient MRN: [id number]  Attending Provider: Nelson Green MD  Service: Urology  Chief Complaint    Right hydrocele    HPI   Talha Lutz IV is a 65 y.o. male with moderate-sized right hydrocele, quite uncomfortable, tender, occasional pain.  I plan right hydrocelectomy.  Potential risks and complications discussed, and informed consent was given by the patient.  Medications  Meds/Allergies   No current facility-administered medications for this encounter.      Cannot display prior to admission medications because the patient has not been admitted in this contact.     No current facility-administered medications for this encounter.    Current Outpatient Medications:     aspirin 81 MG tablet, Take by mouth, Disp: , Rfl:     B Complex-C (Super B Complex/Vitamin C) TABS, , Disp: , Rfl:     Empagliflozin (Jardiance) 25 MG TABS, Take 25 mg by mouth daily , Disp: , Rfl:     glipiZIDE (GLUCOTROL) 10 mg tablet, Take 10 mg by mouth daily, Disp: , Rfl:     ibuprofen (MOTRIN) 600 mg tablet, Take by mouth, Disp: , Rfl:     metFORMIN (GLUCOPHAGE-XR) 500 mg 24 hr tablet, Take 2,000 mg by mouth daily with dinner, Disp: , Rfl:     Omeprazole 20 MG TBDD, Take 20 mg by mouth, Disp: , Rfl:     pioglitazone (ACTOS) 30 mg tablet, Take 30 mg by mouth daily, Disp: , Rfl:     rosuvastatin (CRESTOR) 40 MG tablet, 40 mg, Disp: , Rfl:     cefuroxime (CEFTIN) 250 mg tablet, Take 1 tablet (250 mg total) by mouth every 12 (twelve) hours for 5 days, Disp: 10 tablet, Rfl: 0    HYDROcodone-acetaminophen (NORCO) 5-325 mg per tablet, Take 1 tablet by mouth every 4 (four) hours as needed for pain for up to 8 days Max Daily Amount: 6 tablets, Disp: 20 tablet, Rfl: 0  Review of Systems  10 point review of systems negative except as noted in HPI  Allergies  Allergies   Allergen Reactions    Semaglutide(0.25 Or 0.5mg-Dos) GI Intolerance     PMH  Past Medical History:   Diagnosis Date    Back pain      Benign localized prostatic hyperplasia with lower urinary tract symptoms (LUTS)     Diabetes mellitus (HCC) 2005    Feeling of incomplete bladder emptying     Hypercholesteremia     Nocturia     Personal history of colonic polyps 2016    tubular adenoma    Tinnitus     Type 2 diabetes mellitus (HCC)     Urinary frequency     Weak urinary stream      Past surgical history  Past Surgical History:   Procedure Laterality Date    BACK SURGERY  1983    COLONOSCOPY  12/2019    7mm hyperplastic sigmoid polyp, 2mm small benign lymphoid distal transverse polyp Dr Diego    SINUS SURGERY  1982    For removal of  benign tumor    VASECTOMY Bilateral 1987     Social history  Social History     Tobacco Use    Smoking status: Every Day     Current packs/day: 1.00     Average packs/day: 1 pack/day for 44.3 years (44.3 ttl pk-yrs)     Types: Cigarettes     Start date: 1/1/1981    Smokeless tobacco: Never   Vaping Use    Vaping status: Never Used   Substance Use Topics    Alcohol use: Not Currently     Comment: 2 x week    Drug use: No     ?  Physical Exam    .vs  General appearance: alert and oriented, in no acute distress  Head: Normocephalic, without obvious abnormality, atraumatic  Throat: lips, mucosa, and tongue normal; teeth and gums normal  Neck: no adenopathy, no carotid bruit, no JVD, supple, symmetrical, trachea midline, and thyroid not enlarged, symmetric, no tenderness/mass/nodules  Lungs: clear to auscultation bilaterally  Heart: regular rate and rhythm, S1, S2 normal, no murmur, click, rub or gallop  Abdomen: soft, non-tender; bowel sounds normal; no masses,  no organomegaly  Extremities: extremities normal, warm and well-perfused; no cyanosis, clubbing, or edema  Nelson Green MD

## 2025-04-25 NOTE — PROGRESS NOTES
Patient's wife requested postop medication to be sent today for surgery next week.    I sent Norco and cefuroxime to pharmacy.

## 2025-04-27 ENCOUNTER — ANESTHESIA EVENT (OUTPATIENT)
Dept: PERIOP | Facility: HOSPITAL | Age: 65
End: 2025-04-27
Payer: COMMERCIAL

## 2025-04-29 ENCOUNTER — ANESTHESIA (OUTPATIENT)
Dept: PERIOP | Facility: HOSPITAL | Age: 65
End: 2025-04-29
Payer: COMMERCIAL

## 2025-04-29 ENCOUNTER — HOSPITAL ENCOUNTER (OUTPATIENT)
Facility: HOSPITAL | Age: 65
Setting detail: OUTPATIENT SURGERY
Discharge: HOME | End: 2025-04-29
Attending: UROLOGY | Admitting: UROLOGY
Payer: COMMERCIAL

## 2025-04-29 VITALS
DIASTOLIC BLOOD PRESSURE: 63 MMHG | HEART RATE: 61 BPM | TEMPERATURE: 97 F | WEIGHT: 203.93 LBS | RESPIRATION RATE: 16 BRPM | SYSTOLIC BLOOD PRESSURE: 127 MMHG | OXYGEN SATURATION: 93 % | BODY MASS INDEX: 27.66 KG/M2

## 2025-04-29 DIAGNOSIS — E11.9 TYPE 2 DIABETES MELLITUS WITHOUT COMPLICATION, UNSPECIFIED WHETHER LONG TERM INSULIN USE (HCC): ICD-10-CM

## 2025-04-29 DIAGNOSIS — N43.0 ENCYSTED HYDROCELE: ICD-10-CM

## 2025-04-29 LAB
GLUCOSE SERPL-MCNC: 108 MG/DL (ref 65–140)
GLUCOSE SERPL-MCNC: 139 MG/DL (ref 65–140)

## 2025-04-29 PROCEDURE — 82948 REAGENT STRIP/BLOOD GLUCOSE: CPT

## 2025-04-29 PROCEDURE — 88304 TISSUE EXAM BY PATHOLOGIST: CPT | Performed by: PATHOLOGY

## 2025-04-29 PROCEDURE — 54840 REMOVE EPIDIDYMIS LESION: CPT | Performed by: UROLOGY

## 2025-04-29 PROCEDURE — 99024 POSTOP FOLLOW-UP VISIT: CPT | Performed by: UROLOGY

## 2025-04-29 RX ORDER — CEFAZOLIN SODIUM 2 G/50ML
2000 SOLUTION INTRAVENOUS ONCE
Status: COMPLETED | OUTPATIENT
Start: 2025-04-29 | End: 2025-04-29

## 2025-04-29 RX ORDER — FENTANYL CITRATE 50 UG/ML
INJECTION, SOLUTION INTRAMUSCULAR; INTRAVENOUS AS NEEDED
Status: DISCONTINUED | OUTPATIENT
Start: 2025-04-29 | End: 2025-04-29

## 2025-04-29 RX ORDER — LIDOCAINE HCL/PF 100 MG/5ML
SYRINGE (ML) INJECTION AS NEEDED
Status: DISCONTINUED | OUTPATIENT
Start: 2025-04-29 | End: 2025-04-29

## 2025-04-29 RX ORDER — ONDANSETRON 2 MG/ML
4 INJECTION INTRAMUSCULAR; INTRAVENOUS ONCE AS NEEDED
Status: DISCONTINUED | OUTPATIENT
Start: 2025-04-29 | End: 2025-04-29 | Stop reason: HOSPADM

## 2025-04-29 RX ORDER — MIDAZOLAM HYDROCHLORIDE 2 MG/2ML
INJECTION, SOLUTION INTRAMUSCULAR; INTRAVENOUS AS NEEDED
Status: DISCONTINUED | OUTPATIENT
Start: 2025-04-29 | End: 2025-04-29

## 2025-04-29 RX ORDER — FENTANYL CITRATE/PF 50 MCG/ML
25 SYRINGE (ML) INJECTION
Status: COMPLETED | OUTPATIENT
Start: 2025-04-29 | End: 2025-04-29

## 2025-04-29 RX ORDER — SODIUM CHLORIDE, SODIUM LACTATE, POTASSIUM CHLORIDE, CALCIUM CHLORIDE 600; 310; 30; 20 MG/100ML; MG/100ML; MG/100ML; MG/100ML
20 INJECTION, SOLUTION INTRAVENOUS CONTINUOUS
Status: DISCONTINUED | OUTPATIENT
Start: 2025-04-29 | End: 2025-04-29 | Stop reason: HOSPADM

## 2025-04-29 RX ORDER — ONDANSETRON 2 MG/ML
INJECTION INTRAMUSCULAR; INTRAVENOUS AS NEEDED
Status: DISCONTINUED | OUTPATIENT
Start: 2025-04-29 | End: 2025-04-29

## 2025-04-29 RX ORDER — BUPIVACAINE HYDROCHLORIDE 5 MG/ML
INJECTION, SOLUTION EPIDURAL; INTRACAUDAL; PERINEURAL AS NEEDED
Status: DISCONTINUED | OUTPATIENT
Start: 2025-04-29 | End: 2025-04-29 | Stop reason: HOSPADM

## 2025-04-29 RX ORDER — SODIUM CHLORIDE, SODIUM LACTATE, POTASSIUM CHLORIDE, CALCIUM CHLORIDE 600; 310; 30; 20 MG/100ML; MG/100ML; MG/100ML; MG/100ML
125 INJECTION, SOLUTION INTRAVENOUS CONTINUOUS
Status: DISCONTINUED | OUTPATIENT
Start: 2025-04-29 | End: 2025-04-29 | Stop reason: HOSPADM

## 2025-04-29 RX ORDER — OXYCODONE AND ACETAMINOPHEN 5; 325 MG/1; MG/1
1 TABLET ORAL EVERY 4 HOURS PRN
Status: DISCONTINUED | OUTPATIENT
Start: 2025-04-29 | End: 2025-04-29 | Stop reason: HOSPADM

## 2025-04-29 RX ORDER — MAGNESIUM HYDROXIDE 1200 MG/15ML
LIQUID ORAL AS NEEDED
Status: DISCONTINUED | OUTPATIENT
Start: 2025-04-29 | End: 2025-04-29 | Stop reason: HOSPADM

## 2025-04-29 RX ORDER — HYDROMORPHONE HCL/PF 1 MG/ML
0.5 SYRINGE (ML) INJECTION
Status: DISCONTINUED | OUTPATIENT
Start: 2025-04-29 | End: 2025-04-29 | Stop reason: HOSPADM

## 2025-04-29 RX ORDER — PROPOFOL 10 MG/ML
INJECTION, EMULSION INTRAVENOUS AS NEEDED
Status: DISCONTINUED | OUTPATIENT
Start: 2025-04-29 | End: 2025-04-29

## 2025-04-29 RX ORDER — MEPERIDINE HYDROCHLORIDE 25 MG/ML
12.5 INJECTION INTRAMUSCULAR; INTRAVENOUS; SUBCUTANEOUS
Status: DISCONTINUED | OUTPATIENT
Start: 2025-04-29 | End: 2025-04-29 | Stop reason: HOSPADM

## 2025-04-29 RX ORDER — OXYCODONE AND ACETAMINOPHEN 5; 325 MG/1; MG/1
2 TABLET ORAL EVERY 4 HOURS PRN
Status: DISCONTINUED | OUTPATIENT
Start: 2025-04-29 | End: 2025-04-29 | Stop reason: HOSPADM

## 2025-04-29 RX ADMIN — FENTANYL CITRATE 25 MCG: 50 INJECTION INTRAMUSCULAR; INTRAVENOUS at 08:43

## 2025-04-29 RX ADMIN — SODIUM CHLORIDE, SODIUM LACTATE, POTASSIUM CHLORIDE, AND CALCIUM CHLORIDE 20 ML/HR: .6; .31; .03; .02 INJECTION, SOLUTION INTRAVENOUS at 06:25

## 2025-04-29 RX ADMIN — LIDOCAINE HYDROCHLORIDE 100 MG: 20 INJECTION INTRAVENOUS at 07:28

## 2025-04-29 RX ADMIN — FENTANYL CITRATE 25 MCG: 50 INJECTION INTRAMUSCULAR; INTRAVENOUS at 08:55

## 2025-04-29 RX ADMIN — OXYCODONE HYDROCHLORIDE AND ACETAMINOPHEN 1 TABLET: 5; 325 TABLET ORAL at 09:49

## 2025-04-29 RX ADMIN — FENTANYL CITRATE 50 MCG: 50 INJECTION INTRAMUSCULAR; INTRAVENOUS at 07:59

## 2025-04-29 RX ADMIN — CEFAZOLIN SODIUM 2000 MG: 2 SOLUTION INTRAVENOUS at 07:19

## 2025-04-29 RX ADMIN — ONDANSETRON 4 MG: 2 INJECTION INTRAMUSCULAR; INTRAVENOUS at 07:28

## 2025-04-29 RX ADMIN — FENTANYL CITRATE 50 MCG: 50 INJECTION INTRAMUSCULAR; INTRAVENOUS at 07:32

## 2025-04-29 RX ADMIN — FENTANYL CITRATE 25 MCG: 50 INJECTION INTRAMUSCULAR; INTRAVENOUS at 09:21

## 2025-04-29 RX ADMIN — FENTANYL CITRATE 25 MCG: 50 INJECTION INTRAMUSCULAR; INTRAVENOUS at 09:14

## 2025-04-29 RX ADMIN — PROPOFOL 200 MG: 10 INJECTION, EMULSION INTRAVENOUS at 07:28

## 2025-04-29 RX ADMIN — MIDAZOLAM 2 MG: 1 INJECTION INTRAMUSCULAR; INTRAVENOUS at 07:24

## 2025-04-29 NOTE — ANESTHESIA POSTPROCEDURE EVALUATION
Post-Op Assessment Note    CV Status:  Stable    Pain management: adequate       Mental Status:  Alert and awake   Hydration Status:  Euvolemic   PONV Controlled:  Controlled   Airway Patency:  Patent     Post Op Vitals Reviewed: Yes    No anethesia notable event occurred.    Staff: Anesthesiologist           Last Filed PACU Vitals:  Vitals Value Taken Time   Temp 97.6 °F (36.4 °C) 04/29/25 0932   Pulse 54 04/29/25 0934   /76 04/29/25 0932   Resp 16 04/29/25 0932   SpO2 93 % 04/29/25 0934   Vitals shown include unfiled device data.    Modified Lyndon:     Vitals Value Taken Time   Activity 2 04/29/25 0917   Respiration 2 04/29/25 0917   Circulation 2 04/29/25 0917   Consciousness 2 04/29/25 0917   Oxygen Saturation 2 04/29/25 0917     Modified Lyndon Score: 10

## 2025-04-29 NOTE — OP NOTE
OPERATIVE REPORT  PATIENT NAME: Talha Lutz IV    :  1960  MRN: [id number]  Pt Location: [facility] OR ROOM 06    SURGERY DATE: 2025    Surgeons and Role:     * Nelson Green MD - Primary    Preop Diagnosis:  Encysted hydrocele [N43.0]    Post-Op Diagnosis Codes:     * Encysted hydrocele [N43.0]    Procedure(s):  Right - right spermatocelectomy and right epidydimectomy    Specimen(s):  ID Type Source Tests Collected by Time Destination   1 : right spermatocele sac Tissue Spermatocele TISSUE EXAM Nelson Green MD 2025  7:49 AM    2 : Right epididymis Tissue Epididymis TISSUE EXAM Nelson Green MD 2025  7:54 AM        Estimated Blood Loss:   Minimal    Drains:  Open Drain Right Groin (Active)   Number of days: 0       Anesthesia Type:   General    Operative Indications:  Encysted hydrocele [N43.0]      Operative Findings:  Large right spermatocele and inflamed epididymis      Complications:   None    Procedure and Technique:  After the patient was placed under adequate general anesthesia, he was prepped and draped using chlorhexidine in supine position.  1/2% Marcaine was instilled on the right scrotum in the area of incision, and in the right spermatic cord.  A right scrotal incision was made and taken down to the dartos muscle down to the plane between the fluid sac and the dartos muscle.  The tunica vaginalis was inflamed and adherent to the underlying spermatocele sac.  Opening up the cystic mass, this drained the spermatocele of milky white fluid.  Redundant spermatocele sac was excised off.  The epididymis was inflamed and that was removed as well by  it from the testicle and the vas deferens using electrocautery, 1 suture of 4-0 Vicryl was used on the stump of the epididymis where it joined the vas deferens.    Careful hemostasis was obtained using electrocautery.    The testicle appeared normal.    After ensuring good hemostasis, a drain was placed through a separate stab incision.   The testicle was placed back in the scrotum.  The wound was closed in layers using running 3-0 chromic on the dartos muscle.  Running 4-0 Vicryl on the skin.    The drain was sutured to the dressing, for removal by the patient tomorrow morning.  Sterile dressing was applied and patient taken to recovery in good condition.   I was present for the entire procedure.    Patient Disposition:  PACU              SIGNATURE: Nelson Green MD  DATE: April 29, 2025  TIME: 8:24 AM

## 2025-04-29 NOTE — DISCHARGE INSTR - AVS FIRST PAGE
ALL YOUR  PREVIOUS MEDS ARE THE SAME.    NEW MEDS: None none    Remove the jockstrap and bandage tomorrow morning.  A small rubber band type of  drain will come out of the skin with the bandage, just throw it all the way.    Expect a small amount of blood spotting on the skin opening where the drain was, it will scab over.    Stitches will dissolve in 3 weeks or so, just let them fall out when they are ready.    ACTIVITY:  RESUME FULL ACTIVITY Saturday, gradually resume full exertional activity.

## 2025-04-29 NOTE — QUICK NOTE
See op report.  The preop diagnosis was hydrocele, but this was actually a large spermatocele with an inflamed epididymis.    The surgical approach was identical, large fluid sac opened and drained, epididymis  removed.    Drain placed which patient will will remove with a dressing tomorrow morning.    I told him to expect significant swelling from the procedure.  The pain will recede within 7-10 days, the edema from the procedure can take 4 to 6 weeks to all go away.

## 2025-04-29 NOTE — DISCHARGE SUMMARY
Discharge Summary - Talha Lutz IV 65 y.o. male MRN: [id number]    Admission Date: 4/29/2025     Admitting Diagnosis: Encysted hydrocele [N43.0]    Procedures Performed: Right spermatocelectomy, right epididymectomy    Patient underwent planned outpt surgery and recovered without complication. Discharged in good condition.  Medications were prescribed.  Pt knows to have office follow-up at the appropriate time.

## 2025-04-29 NOTE — ANESTHESIA PREPROCEDURE EVALUATION
Procedure:  HYDROCELECTOMY (Right: Scrotum)    Relevant Problems   ENDO   (+) Type 2 diabetes mellitus (HCC)        Physical Exam    Airway    Mallampati score: II  TM Distance: >3 FB       Dental   No notable dental hx     Cardiovascular  Cardiovascular exam normal    Pulmonary  Pulmonary exam normal     Other Findings        Anesthesia Plan  ASA Score- 2     Anesthesia Type- general with ASA Monitors.         Additional Monitors:     Airway Plan: LMA.           Plan Factors-Exercise tolerance (METS): >4 METS.    Chart reviewed.        Patient is a current smoker.      Obstructive sleep apnea risk education given perioperatively.        Induction- intravenous.    Postoperative Plan- Plan for postoperative opioid use. Planned trial extubation        Informed Consent- Anesthetic plan and risks discussed with patient.        NPO Status:  No vitals data found for the desired time range.

## 2025-04-30 ENCOUNTER — TELEPHONE (OUTPATIENT)
Dept: UROLOGY | Facility: MEDICAL CENTER | Age: 65
End: 2025-04-30

## 2025-04-30 NOTE — TELEPHONE ENCOUNTER
"Post Op Note    Talha Lutz IV is a 65 y.o. male s/p right spermatocelectomy and right epidymectomy (Right: Scrotum)  performed 4/29/25 with Dr. Green.     How would you rate your pain on a scale from 1 to 10, 10 being the worst pain ever? 0 Pt is taking hydrocodone  Have you had a fever? No    Have your bowel movements been regular? Yes  Do you have any difficulty urinating? No  If the patient has an incision- do you have any redness around the incision or any drainage from the incision? No  If the patient has a drain- are you having any issues with the drain? No    Do you have any other questions or concerns that I can address at this time?     Pt stated he is doing \"ok\".  He was advised to take stool softeners while taking hydrocodone with good hydration.  He will take antibiotic to completion.  He will remove dressing with the drain today.  He will contact the office with any excessive bleeding or discharge from the site.  Pt was advised that scrotal swelling can take weeks to go away per Dr. Green.  He knows to contact the office with any questions or concerns.     "

## 2025-05-02 PROCEDURE — 88304 TISSUE EXAM BY PATHOLOGIST: CPT | Performed by: PATHOLOGY

## 2025-05-12 NOTE — PROGRESS NOTES
Name: Talha Lutz IV      : 1960      MRN: [id number]  Encounter Provider: MARILY Davis  Encounter Date: 2025   Encounter department: [facility] UROLOGY [place]  :  Assessment & Plan  Encysted hydrocele  Patient had hydrocelectomy by Dr. Green 25.    Patient denies pain, fever, chills.  Patient overall feeling well. Healing well.  Skin is clean and dry. Left testicle remains firm and somewhat swollen as expected  Follow up in  2 weeks          Screening for prostate cancer  Patient due for yearly PSA.  Prescription ordered we will see him in 2 weeks for ROWDY exam and review PSA  Orders:    PSA, Total Screen; Future        History of Present Illness   Talha Lutz IV is a 65 y.o. male who presents for 2 weeks follow up for hydrocelectomy performed by Dr. Green on 2025.  Patient denies pain just mentions soreness on right testicle where procedure was done.  Patient denies chills, drainage  Review of Systems   Constitutional:  Negative for chills and fever.   HENT:  Negative for ear pain and sore throat.    Eyes:  Negative for pain and visual disturbance.   Respiratory:  Negative for cough and shortness of breath.    Cardiovascular:  Negative for chest pain and palpitations.   Gastrointestinal:  Negative for abdominal pain and vomiting.   Genitourinary:  Negative for dysuria and hematuria.   Musculoskeletal:  Negative for arthralgias and back pain.   Skin:  Negative for color change and rash.   Neurological:  Negative for seizures and syncope.   All other systems reviewed and are negative.         Objective   There were no vitals taken for this visit.    Physical Exam  Vitals reviewed.   Constitutional:       Appearance: Normal appearance.   Cardiovascular:      Rate and Rhythm: Normal rate.   Pulmonary:      Effort: Pulmonary effort is normal.   Genitourinary:     Testes:         Left: Tenderness and swelling present.   Skin:     General: Skin is warm.   Neurological:  "     General: No focal deficit present.      Mental Status: He is oriented to person, place, and time.   Psychiatric:         Mood and Affect: Mood normal.         Behavior: Behavior normal.           Results   No results found for: \"PSA\"  Lab Results   Component Value Date    CALCIUM 9.3 04/17/2025    K 4.3 04/17/2025    CO2 25 04/17/2025     04/17/2025    BUN 25 04/17/2025    CREATININE 1.08 04/17/2025     Lab Results   Component Value Date    WBC 12.6 (H) 04/17/2025    HGB 15.3 04/17/2025    HCT 45.8 04/17/2025    MCV 94 04/17/2025     (H) 04/17/2025       Office Urine Dip  No results found for this or any previous visit (from the past hour).      "

## 2025-05-13 ENCOUNTER — OFFICE VISIT (OUTPATIENT)
Dept: UROLOGY | Facility: MEDICAL CENTER | Age: 65
End: 2025-05-13

## 2025-05-13 VITALS — HEART RATE: 65 BPM | OXYGEN SATURATION: 96 % | DIASTOLIC BLOOD PRESSURE: 70 MMHG | SYSTOLIC BLOOD PRESSURE: 118 MMHG

## 2025-05-13 DIAGNOSIS — N43.0 ENCYSTED HYDROCELE: Primary | ICD-10-CM

## 2025-05-13 DIAGNOSIS — Z12.5 SCREENING FOR PROSTATE CANCER: ICD-10-CM

## 2025-05-13 PROCEDURE — 99024 POSTOP FOLLOW-UP VISIT: CPT

## 2025-05-21 NOTE — PROGRESS NOTES
"Name: Talha Lutz IV      : 1960      MRN: [id number]  Encounter Provider: MARILY Davis  Encounter Date: 2025   Enc[facility]  :  Assessment & Plan  Screening for prostate cancer  ROWDY: benign   PSA : will obtain [facility] labs  3.05 (2024)  2.50 (2023)  1.94 (2022)  2.81 (3/6/2021)  Rectal exam benign today.   He can follow-up in 1 year with PSA prior.          Encysted hydrocele  Hydrocelectomy by Dr. Green on 2025  Denies pain, fever, chills.  Patient overall feeling well healing well.  Patient is clean and dry  2-week follow-up right testicle remains firm and swelling has reduced significantly.           History of Present Illness   Talha Lutz IV is a 65 y.o. male who presents for 2-week follow-up after right hydrocelectomy with Dr. Green 2025.  Patient denies pain and reports improvement from soreness on the right testicle, denies chills, fever, drainage.    Review of Systems       Objective   BP 90/60 (BP Location: Left arm, Patient Position: Sitting, Cuff Size: Standard)   Pulse 92   Ht 6' (1.829 m)   SpO2 97%   BMI 27.66 kg/m²     Physical Exam  Vitals reviewed.   Constitutional:       Appearance: Normal appearance.     Cardiovascular:      Rate and Rhythm: Normal rate.   Genitourinary:     Testes:         Right: Swelling present. Mass, tenderness, testicular hydrocele or varicocele not present. Right testis is descended. Cremasteric reflex is present.      Skin:     General: Skin is warm.     Neurological:      General: No focal deficit present.      Mental Status: He is oriented to person, place, and time.     Psychiatric:         Mood and Affect: Mood normal.         Behavior: Behavior normal.           Results   No results found for: \"PSA\"  Lab Results   Component Value Date    CALCIUM 9.3 2025    K 4.3 2025    CO2 25 2025     2025    BUN 2025    CREATININE 1.08 " 04/17/2025     Lab Results   Component Value Date    WBC 12.6 (H) 04/17/2025    HGB 15.3 04/17/2025    HCT 45.8 04/17/2025    MCV 94 04/17/2025     (H) 04/17/2025       Office Urine Dip  No results found for this or any previous visit (from the past hour).

## 2025-05-28 ENCOUNTER — TELEPHONE (OUTPATIENT)
Dept: UROLOGY | Facility: MEDICAL CENTER | Age: 65
End: 2025-05-28

## 2025-05-28 ENCOUNTER — OFFICE VISIT (OUTPATIENT)
Dept: UROLOGY | Facility: MEDICAL CENTER | Age: 65
End: 2025-05-28

## 2025-05-28 VITALS
HEART RATE: 92 BPM | HEIGHT: 72 IN | SYSTOLIC BLOOD PRESSURE: 90 MMHG | BODY MASS INDEX: 27.66 KG/M2 | OXYGEN SATURATION: 97 % | DIASTOLIC BLOOD PRESSURE: 60 MMHG

## 2025-05-28 DIAGNOSIS — N43.0 ENCYSTED HYDROCELE: ICD-10-CM

## 2025-05-28 DIAGNOSIS — Z12.5 SCREENING FOR PROSTATE CANCER: Primary | ICD-10-CM

## 2025-05-28 DIAGNOSIS — R97.20 ELEVATED PSA: Primary | ICD-10-CM

## 2025-05-28 NOTE — TELEPHONE ENCOUNTER
Patient has elevated PSA: 5.65 (5/21/2025) from [facility] lab reviewed on scanned.  Discussed with patient to repeat PSA in 2 weeks.

## 2025-05-28 NOTE — TELEPHONE ENCOUNTER
Patient's wife called to review the plan for the elevated PSA, reviewed with her to repeat in 2 weeks and activities to avoid prior. They are requesting lap slip be mailed d/t cannot use [facility] labs.

## 2025-06-27 ENCOUNTER — APPOINTMENT (EMERGENCY)
Dept: RADIOLOGY | Facility: HOSPITAL | Age: 65
End: 2025-06-27
Payer: OTHER GOVERNMENT

## 2025-06-27 ENCOUNTER — APPOINTMENT (EMERGENCY)
Dept: CT IMAGING | Facility: HOSPITAL | Age: 65
End: 2025-06-27
Payer: OTHER GOVERNMENT

## 2025-06-27 ENCOUNTER — HOSPITAL ENCOUNTER (EMERGENCY)
Facility: HOSPITAL | Age: 65
Discharge: HOME | End: 2025-06-27
Attending: FAMILY MEDICINE
Payer: OTHER GOVERNMENT

## 2025-06-27 VITALS
TEMPERATURE: 97.7 F | OXYGEN SATURATION: 98 % | RESPIRATION RATE: 18 BRPM | DIASTOLIC BLOOD PRESSURE: 83 MMHG | HEART RATE: 63 BPM | SYSTOLIC BLOOD PRESSURE: 149 MMHG

## 2025-06-27 DIAGNOSIS — M54.10 RADICULOPATHY: Primary | ICD-10-CM

## 2025-06-27 DIAGNOSIS — M25.559 HIP PAIN: ICD-10-CM

## 2025-06-27 DIAGNOSIS — M51.26 LUMBAR DISC HERNIATION: ICD-10-CM

## 2025-06-27 PROCEDURE — 72131 CT LUMBAR SPINE W/O DYE: CPT

## 2025-06-27 PROCEDURE — 96372 THER/PROPH/DIAG INJ SC/IM: CPT

## 2025-06-27 PROCEDURE — 99284 EMERGENCY DEPT VISIT MOD MDM: CPT

## 2025-06-27 PROCEDURE — 99284 EMERGENCY DEPT VISIT MOD MDM: CPT | Performed by: FAMILY MEDICINE

## 2025-06-27 PROCEDURE — 73502 X-RAY EXAM HIP UNI 2-3 VIEWS: CPT

## 2025-06-27 RX ORDER — DEXAMETHASONE SODIUM PHOSPHATE 4 MG/ML
4 INJECTION, SOLUTION INTRA-ARTICULAR; INTRALESIONAL; INTRAMUSCULAR; INTRAVENOUS; SOFT TISSUE ONCE
Status: DISCONTINUED | OUTPATIENT
Start: 2025-06-27 | End: 2025-06-27

## 2025-06-27 RX ORDER — METHOCARBAMOL 500 MG/1
500 TABLET, FILM COATED ORAL 2 TIMES DAILY
Qty: 20 TABLET | Refills: 0 | Status: SHIPPED | OUTPATIENT
Start: 2025-06-27

## 2025-06-27 RX ORDER — METHOCARBAMOL 500 MG/1
500 TABLET, FILM COATED ORAL ONCE
Status: COMPLETED | OUTPATIENT
Start: 2025-06-27 | End: 2025-06-27

## 2025-06-27 RX ORDER — LIDOCAINE 50 MG/G
1 PATCH TOPICAL ONCE
Status: DISCONTINUED | OUTPATIENT
Start: 2025-06-27 | End: 2025-06-27 | Stop reason: HOSPADM

## 2025-06-27 RX ORDER — PREDNISONE 20 MG/1
40 TABLET ORAL DAILY
Qty: 10 TABLET | Refills: 0 | Status: SHIPPED | OUTPATIENT
Start: 2025-06-27 | End: 2025-07-02

## 2025-06-27 RX ORDER — DEXAMETHASONE SODIUM PHOSPHATE 4 MG/ML
4 INJECTION, SOLUTION INTRA-ARTICULAR; INTRALESIONAL; INTRAMUSCULAR; INTRAVENOUS; SOFT TISSUE ONCE
Status: COMPLETED | OUTPATIENT
Start: 2025-06-27 | End: 2025-06-27

## 2025-06-27 RX ORDER — NAPROXEN 500 MG/1
500 TABLET ORAL 2 TIMES DAILY WITH MEALS
Qty: 30 TABLET | Refills: 0 | Status: SHIPPED | OUTPATIENT
Start: 2025-06-27

## 2025-06-27 RX ORDER — KETOROLAC TROMETHAMINE 30 MG/ML
15 INJECTION, SOLUTION INTRAMUSCULAR; INTRAVENOUS ONCE
Status: COMPLETED | OUTPATIENT
Start: 2025-06-27 | End: 2025-06-27

## 2025-06-27 RX ADMIN — DEXAMETHASONE SODIUM PHOSPHATE 4 MG: 4 INJECTION, SOLUTION INTRAMUSCULAR; INTRAVENOUS at 12:39

## 2025-06-27 RX ADMIN — KETOROLAC TROMETHAMINE 15 MG: 30 INJECTION, SOLUTION INTRAMUSCULAR at 12:11

## 2025-06-27 RX ADMIN — METHOCARBAMOL 500 MG: 500 TABLET ORAL at 12:38

## 2025-06-27 RX ADMIN — LIDOCAINE 1 PATCH: 50 PATCH CUTANEOUS at 12:12

## 2025-06-27 NOTE — ED PROVIDER NOTES
Time reflects when diagnosis was documented in both MDM as applicable and the Disposition within this note       Time User Action Codes Description Comment    6/27/2025 12:03 PM Riccardo Carter Add [M25.559] Hip pain     6/27/2025 12:54 PM CarterHernesto zamoraon Add [M54.10] Radiculopathy     6/27/2025 12:54 PM CarterHernesto zamoraon Modify [M25.559] Hip pain     6/27/2025 12:54 PM CarterRiccardo zamora Modify [M54.10] Radiculopathy     6/27/2025 12:54 PM CarterHernesto zamoraon Add [M51.26] Lumbar disc herniation     6/27/2025 12:54 PM CarterHernesto zamoraon Modify [M51.26] L4/L5 disc herniation           ED Disposition       ED Disposition   Discharge    Condition   Stable    Date/Time   Fri Jun 27, 2025 12:57 PM    Comment   Talha RODGERS Bolivar IV discharge to home/self care.                   Assessment & Plan       Medical Decision Making  Patient is a 65-year-old male with a past medical history of work injury occurring 20 years ago, back surgery approximately 20 years ago, hypertension, hyperlipidemia, diabetes, presenting for a 2 to 3-day history of acute left hip pain that radiates to his left leg.  Patient reports attempting to manage pain with Tylenol ibuprofen with little to no relief, as well as use of his wife's tramadol with minimal relief. On evaluation patient is well appearing, abdomen soft nontender, nondistended, no paraspinal tenderness appreciated, positive straight leg raise of the left hip, patient neurovascularly distal to injury. Differential at this time includes ac    Amount and/or Complexity of Data Reviewed  Radiology: ordered. Decision-making details documented in ED Course.    Risk  Prescription drug management.        ED Course as of 06/27/25 1332   Fri Jun 27, 2025   1213 Patient assessed by attending, requesting additional agents for pain. Robaxin and decadron ordered at this time.    1215 XR hip/pelv 2-3 vws left if performed  No acute fracture or malalignment appreciated on my interpretation.   1219 CT spine  lumbar without contrast  No acute vertebral compression fracture appreciated on my interpretation   1232 CT spine lumbar without contrast  Radiologic interpretation: Suspect disc extrusion at L4-5 with thecal sac and left lateral recess impingement. Correlate for left L5 radicular pattern of symptoms.   1257 Disposition:  - Patient stable and comfortable with discharge at this time. Prescribed course of naproxen, robaxin, prednisone. Encourage use of medication prescribed to patient and not others. Advised regarding return precautions.   - Reviewed diagnosis, treatment plan, and expectant course  - Verbal and written instructions given for outpatient follow up   - Discussed reasons to Return to ED.         Medications   lidocaine (LIDODERM) 5 % patch 1 patch (1 patch Topical Medication Applied 6/27/25 1212)   ketorolac (TORADOL) injection 15 mg (15 mg Intramuscular Given 6/27/25 1211)   methocarbamol (ROBAXIN) tablet 500 mg (500 mg Oral Given 6/27/25 1238)   dexamethasone (DECADRON) injection 4 mg (4 mg Intramuscular Given 6/27/25 1239)       ED Risk Strat Scores                    No data recorded        SBIRT 22yo+      Flowsheet Row Most Recent Value   Initial Alcohol Screen: US AUDIT-C     1. How often do you have a drink containing alcohol? 3 Filed at: 06/27/2025 1113   2. How many drinks containing alcohol do you have on a typical day you are drinking?  2 Filed at: 06/27/2025 1113   3a. Male UNDER 65: How often do you have five or more drinks on one occasion? 1 Filed at: 06/27/2025 1113   Audit-C Score 6 Filed at: 06/27/2025 1113   [place]: How many times in the past year have you...    Used an illegal drug or used a prescription medication for non-medical reasons? Never Filed at: 06/27/2025 1113                            History of Present Illness       Chief Complaint   Patient presents with    Leg Pain     Patient with left hip pain since Tuesday.  Pain radiates down the left leg.  Patient reports numbness  and tingling in left foot. Tried Tramadol last night without any improvement in pain.        Past Medical History[1]   Past Surgical History[2]   Family History[3]   Social History[4]   E-Cigarette/Vaping    E-Cigarette Use Never User       E-Cigarette/Vaping Substances    Nicotine No     THC No     CBD No     Flavoring No     Other No     Unknown No       I have reviewed and agree with the history as documented.     Patient is a 65-year-old male with a past medical history of hypertension, hyperlipidemia, diabetes, prior back surgeries and in a prior work injury occurring 20 years ago presenting with a 2 to 3-day history of acute hip pain.  Patient reports the pain is worse when sitting, and with range of motion.  Patient reports taking a few doses of Tylenol and ibuprofen with minimal benefit as well as using a few doses of his wife's tramadol with minimal benefit.  Patient reports that the pain is worse when sitting, but is tolerable while laying down flat.  Denies any chest pain, shortness of breath, nausea, vomiting, bowel or bladder incontinence, paresthesias.         Leg Pain  Associated symptoms: no fatigue, no fever and no neck pain        Review of Systems   Constitutional:  Negative for chills, fatigue and fever.   Respiratory:  Negative for cough, chest tightness and shortness of breath.    Cardiovascular:  Negative for palpitations.   Gastrointestinal:  Negative for abdominal pain, diarrhea, nausea and vomiting.   Musculoskeletal:  Negative for myalgias and neck pain.   Neurological:  Negative for weakness, numbness and headaches.   All other systems reviewed and are negative.          Objective       ED Triage Vitals [06/27/25 1112]   Temperature Pulse Blood Pressure Respirations SpO2 Patient Position - Orthostatic VS   97.7 °F (36.5 °C) 63 149/83 18 98 % Sitting      Temp Source Heart Rate Source BP Location FiO2 (%) Pain Score    Temporal Monitor Left arm -- 8      Vitals      Date and Time Temp  Pulse SpO2 Resp BP Pain Score FACES Pain Rating User   06/27/25 1112 97.7 °F (36.5 °C) 63 98 % 18 149/83 8 -- AP            Physical Exam  Vitals and nursing note reviewed.   Constitutional:       General: He is not in acute distress.     Appearance: He is not ill-appearing.   HENT:      Head: Normocephalic and atraumatic.      Mouth/Throat:      Mouth: Mucous membranes are moist.     Eyes:      Pupils: Pupils are equal, round, and reactive to light.       Cardiovascular:      Rate and Rhythm: Normal rate and regular rhythm.      Pulses: Normal pulses.      Heart sounds: Normal heart sounds. No murmur heard.  Pulmonary:      Effort: Pulmonary effort is normal. No respiratory distress.      Breath sounds: No stridor. No wheezing, rhonchi or rales.   Abdominal:      General: Abdomen is flat. There is no distension.      Palpations: Abdomen is soft.      Tenderness: There is no abdominal tenderness. There is no guarding or rebound.     Musculoskeletal:         General: No swelling or tenderness.      Comments: ROM intact to flexion and extension, no midline tenderness overlying the spinous processes, no paraspinal tenderness. No joint line tenderness or tenderness appreciated along the iliac or proximal femur. Positive straight leg raise.      Skin:     General: Skin is warm and dry.     Neurological:      General: No focal deficit present.      Mental Status: Mental status is at baseline.     Psychiatric:         Mood and Affect: Mood normal.         Behavior: Behavior normal.         Results Reviewed       None            CT spine lumbar without contrast   Final Interpretation by Willam Cruz MD (06/27 8017)      1.  Suspect disc extrusion at L4-5 with thecal sac and left lateral recess impingement. Correlate for left L5 radicular pattern of symptoms.      2.  Generative changes at other levels appear relatively mild.      Workstation performed: [id number]         XR hip/pelv 2-3 vws left if performed     (Results Pending)       Procedures    ED Medication and Procedure Management   Prior to Admission Medications   Prescriptions Last Dose Informant Patient Reported? Taking?   B Complex-C (Super B Complex/Vitamin C) TABS  Self Yes No   Empagliflozin (Jardiance) 25 MG TABS  Self Yes No   Sig: Take 25 mg by mouth in the morning.   aspirin 81 MG tablet  Self Yes No   Sig: Take by mouth   glipiZIDE (GLUCOTROL) 10 mg tablet  Self Yes No   Sig: Take 10 mg by mouth in the morning.   ibuprofen (MOTRIN) 600 mg tablet  Self Yes No   Sig: Take by mouth   metFORMIN (GLUCOPHAGE-XR) 500 mg 24 hr tablet   Yes No   Sig: Take 2,000 mg by mouth daily with dinner   pioglitazone (ACTOS) 30 mg tablet  Self Yes No   Sig: Take 30 mg by mouth in the morning.   rosuvastatin (CRESTOR) 40 MG tablet   Yes No   Si mg      Facility-Administered Medications: None     Discharge Medication List as of 2025 12:59 PM        START taking these medications    Details   methocarbamol (ROBAXIN) 500 mg tablet Take 1 tablet (500 mg total) by mouth 2 (two) times a day, Starting 2025, Normal      naproxen (Naprosyn) 500 mg tablet Take 1 tablet (500 mg total) by mouth 2 (two) times a day with meals, Starting 2025, Normal      predniSONE 20 mg tablet Take 2 tablets (40 mg total) by mouth daily for 5 days, Starting 2025, Until 2025, Normal           CONTINUE these medications which have NOT CHANGED    Details   aspirin 81 MG tablet Take by mouth, Historical Med      B Complex-C (Super B Complex/Vitamin C) TABS Historical Med      Empagliflozin (Jardiance) 25 MG TABS Take 25 mg by mouth in the morning., Historical Med      glipiZIDE (GLUCOTROL) 10 mg tablet Take 10 mg by mouth in the morning., Historical Med      ibuprofen (MOTRIN) 600 mg tablet Take by mouth, Historical Med      metFORMIN (GLUCOPHAGE-XR) 500 mg 24 hr tablet Take 2,000 mg by mouth daily with dinner, Starting Tue 3/25/2025, Historical Med       pioglitazone (ACTOS) 30 mg tablet Take 30 mg by mouth in the morning., Historical Med      rosuvastatin (CRESTOR) 40 MG tablet 40 mg, Starting Thu 1/16/2025, Historical Med             ED SEPSIS DOCUMENTATION   Time reflects when diagnosis was documented in both MDM as applicable and the Disposition within this note       Time User Action Codes Description Comment    6/27/2025 12:03 PM CarterRiccardo zamora Add [M25.559] Hip pain     6/27/2025 12:54 PM CarterRiccardo santillan Add [M54.10] Radiculopathy     6/27/2025 12:54 PM CarterHernesto santillanon Modify [M25.559] Hip pain     6/27/2025 12:54 PM CarterRiccardo zamora Modify [M54.10] Radiculopathy     6/27/2025 12:54 PM CarterRiccardo zamora Add [M51.26] Lumbar disc herniation     6/27/2025 12:54 PM CarterRiccardo zamora Modify [M51.26] L4/L5 disc herniation                    Riccardo Carter PA-C  06/27/25 1308         [1]   Past Medical History:  Diagnosis Date    Back pain     Benign localized prostatic hyperplasia with lower urinary tract symptoms (LUTS)     Diabetes mellitus (HCC) 2005    Feeling of incomplete bladder emptying     Hypercholesteremia     Nocturia     Personal history of colonic polyps 2016    tubular adenoma    Tinnitus     Type 2 diabetes mellitus (HCC)     Urinary frequency     Weak urinary stream    [2]   Past Surgical History:  Procedure Laterality Date    BACK SURGERY  1983    COLONOSCOPY  12/2019    7mm hyperplastic sigmoid polyp, 2mm small benign lymphoid distal transverse polyp Dr Diego    IA EXCISION HYDROCELE UNILATERAL Right 4/29/2025    Procedure: right spermatocelectomy and right epidymectomy;  Surgeon: Nelson Green MD;  Location: [facility];  Service: Urology    SINUS SURGERY  1982    For removal of  benign tumor    VASECTOMY Bilateral 1987   [3]   Family History  Problem Relation Name Age of Onset    Colon cancer Mother Patricia perry     Cancer Mother Patricia perry     Ovarian cancer Sister     [4]   Social History  Tobacco Use    Smoking status: Every Day      Current packs/day: 1.00     Average packs/day: 1 pack/day for 44.5 years (44.5 ttl pk-yrs)     Types: Cigarettes     Start date: 1/1/1981    Smokeless tobacco: Never    Tobacco comments:     Last cigarette 4/28   Vaping Use    Vaping status: Never Used   Substance Use Topics    Alcohol use: Not Currently     Comment: 2 x week    Drug use: No        Riccardo Carter PA-C  06/27/25 9974

## 2025-06-27 NOTE — ED ATTENDING ATTESTATION
6/27/2025  I, Ned Priest MD, saw and evaluated the patient. I have discussed the patient with the resident/non-physician practitioner and agree with the resident's/non-physician practitioner's findings, Plan of Care, and MDM as documented in the resident's/non-physician practitioner's note, except where noted. All available labs and Radiology studies were reviewed.  I was present for key portions of any procedure(s) performed by the resident/non-physician practitioner and I was immediately available to provide assistance.       At this point I agree with the current assessment done in the Emergency Department.  I have conducted an independent evaluation of this patient a history and physical is as follows:    ED Course     65-year-old male presented to ED with the complaint 2-3 history of hip pain.  Patient with history of prior back surgeries states never felt this kind of pain.  Denies any fever chills nausea vomiting.  Denies urinary symptoms, recent trauma, saddle anesthesia, bowel or bladder incontinence, lower extremity weakness, fevers or history of IV drug use or malignancy.  Patient has taken Tylenol ibuprofen without much relief.  Also complain of numbness and tingling.  Differential diagnoses rule out bulging disc/cauda equina/epidural abscess/sciatica/radiculopathy  Plan recommending to obtain CT x-ray pain control  /83 (BP Location: Left arm)   Pulse 63   Temp 97.7 °F (36.5 °C) (Temporal)   Resp 18   SpO2 98%   /83 (BP Location: Left arm)   Pulse 63   Temp 97.7 °F (36.5 °C) (Temporal)   Resp 18   SpO2 98%   General appearance: alert and oriented, in no acute distress  Head: Normocephalic, without obvious abnormality, atraumatic  Lungs: clear to auscultation bilaterally  Heart: regular rate and rhythm, S1, S2 normal, no murmur, click, rub or gallop  Extremities: extremities normal, warm and well-perfused; no cyanosis, clubbing, or edema Left hip/lower back: mild tenderness noted no  stepoff noted, no crepitus noted. Straight leg test positive  Neurologic: Grossly normal    CT reviewed which shows radiculopathy recommending to  Take naproxen twice daily while your pain lasts.  Do not consume other anti-inflammatory medications such as ibuprofen, Motrin, Aleve while on naproxen. Take Robaxin as needed for muscle spasms.  It may make you drowsy so do not consume alcohol, drive or operate machinery while on this medication. You may also take up to a 1000 mg of Tylenol every 8 hours. It is safe to take the other medications. You may also use lidocaine patches that can be found over-the-counter. Ice or moist heat may also help your pain. Follow up with [facility] You should be receiving a call. Return to the ER with any loss of bowel or bladder control, fevers, or significant leg weakness.    Critical Care Time  Procedures

## 2025-06-27 NOTE — DISCHARGE INSTRUCTIONS
Follow up with [facility] for further evaluation and management. Take prednisone, naproxen, and robaxin for management of acute pain. Encourage adherence to medication prescribed to you. Return for worsening symptoms.     Follow with primary care provider for review and continuation of care. For worsening symptoms or the development of severe headache, chest pain, SOB, abdominal pain, nausea, vomiting, diarrhea, or weakness, call 911 or return to the emergency department for further evaluation.

## 2025-06-30 ENCOUNTER — TELEPHONE (OUTPATIENT)
Dept: PHYSICAL THERAPY | Facility: OTHER | Age: 65
End: 2025-06-30

## 2025-06-30 NOTE — TELEPHONE ENCOUNTER
Call placed to the patient per [facility] referral.    Patients wife Tayla answered the call and states her  was sleeping. Explained who I was and the reason for my call. She states that they are going to see his PCP today because the pain has not improved. Encouraged her to have her  give us a call if needed.    This is the 1st attempt to reach the patient.  Will defer per protocol.

## 2025-07-03 NOTE — TELEPHONE ENCOUNTER
Call placed to the patient regarding the referral entered for  [facility].     Message left for the patient. Contact information and hours of operation provided.  Requested the patient to CB when available to discuss [facility] services.    This is the second attempt to reach the patient.  Referral deferred for f/u attempt per protocol.

## (undated) DEVICE — GLOVE SRG BIOGEL 7.5

## (undated) DEVICE — INTENDED FOR TISSUE SEPARATION, AND OTHER PROCEDURES THAT REQUIRE A SHARP SURGICAL BLADE TO PUNCTURE OR CUT.: Brand: BARD-PARKER SAFETY BLADES SIZE 15, STERILE

## (undated) DEVICE — TUBING SUCTION 5MM X 12 FT

## (undated) DEVICE — STRL PENROSE DRAIN 18" X 1/4": Brand: CARDINAL HEALTH

## (undated) DEVICE — PREMIUM DRY TRAY LF: Brand: MEDLINE INDUSTRIES, INC.

## (undated) DEVICE — SUPER SPONGES,MEDIUM: Brand: KERLIX

## (undated) DEVICE — BETHLEHEM UNIVERSAL MINOR GEN: Brand: CARDINAL HEALTH

## (undated) DEVICE — NEPTUNE E-SEP SMOKE EVACUATION PENCIL, COATED, 70MM BLADE, PUSH BUTTON SWITCH: Brand: NEPTUNE E-SEP

## (undated) DEVICE — SUT CHROMIC 3-0 SH 27 IN G122H

## (undated) DEVICE — SUT VICRYL 4-0 PS-2 27 IN J426H

## (undated) DEVICE — EXIDINE 4 PCT

## (undated) DEVICE — MEDI-VAC YANKAUER SUCTION HANDLE W/BULBOUS AND CONTROL VENT: Brand: CARDINAL HEALTH

## (undated) DEVICE — SCD SEQUENTIAL COMPRESSION COMFORT SLEEVE MEDIUM KNEE LENGTH: Brand: KENDALL SCD

## (undated) DEVICE — RAZOR STERILE

## (undated) DEVICE — SINGLE PORT MANIFOLD: Brand: NEPTUNE 2